# Patient Record
Sex: FEMALE | Race: WHITE
[De-identification: names, ages, dates, MRNs, and addresses within clinical notes are randomized per-mention and may not be internally consistent; named-entity substitution may affect disease eponyms.]

---

## 2024-04-12 ENCOUNTER — HOSPITAL ENCOUNTER (OUTPATIENT)
Dept: HOSPITAL 101 - US | Age: 44
Discharge: HOME | End: 2024-04-12
Payer: COMMERCIAL

## 2024-04-12 VITALS — HEART RATE: 69 BPM | OXYGEN SATURATION: 100 % | DIASTOLIC BLOOD PRESSURE: 85 MMHG | SYSTOLIC BLOOD PRESSURE: 116 MMHG

## 2024-04-12 DIAGNOSIS — E04.2: Primary | ICD-10-CM

## 2024-04-12 PROCEDURE — 99999: CPT

## 2024-04-12 PROCEDURE — 88173 CYTOPATH EVAL FNA REPORT: CPT

## 2024-04-12 PROCEDURE — 10005 FNA BX W/US GDN 1ST LES: CPT

## 2024-04-12 PROCEDURE — 10006 FNA BX W/US GDN EA ADDL: CPT

## 2024-04-12 RX ADMIN — LIDOCAINE HYDROCHLORIDE 0 ML: 10 INJECTION, SOLUTION INFILTRATION; PERINEURAL at 14:55

## 2024-09-09 ENCOUNTER — OFFICE VISIT (OUTPATIENT)
Dept: DERMATOLOGY | Age: 44
End: 2024-09-09
Payer: COMMERCIAL

## 2024-09-09 VITALS
OXYGEN SATURATION: 98 % | BODY MASS INDEX: 21.79 KG/M2 | TEMPERATURE: 98 F | DIASTOLIC BLOOD PRESSURE: 79 MMHG | HEIGHT: 66 IN | SYSTOLIC BLOOD PRESSURE: 121 MMHG | HEART RATE: 67 BPM

## 2024-09-09 DIAGNOSIS — Z79.899 ON ISOTRETINOIN THERAPY: ICD-10-CM

## 2024-09-09 DIAGNOSIS — H01.136 ECZEMATOUS DERMATITIS OF EYELIDS OF BOTH EYES, UNSPECIFIED EYELID: ICD-10-CM

## 2024-09-09 DIAGNOSIS — L66.1 FRONTAL FIBROSING ALOPECIA: Primary | ICD-10-CM

## 2024-09-09 DIAGNOSIS — H01.133 ECZEMATOUS DERMATITIS OF EYELIDS OF BOTH EYES, UNSPECIFIED EYELID: ICD-10-CM

## 2024-09-09 DIAGNOSIS — D23.30 FIBROUS PAPULE OF FACE: ICD-10-CM

## 2024-09-09 PROCEDURE — G8427 DOCREV CUR MEDS BY ELIG CLIN: HCPCS | Performed by: DERMATOLOGY

## 2024-09-09 PROCEDURE — 99204 OFFICE O/P NEW MOD 45 MIN: CPT | Performed by: DERMATOLOGY

## 2024-09-09 PROCEDURE — G8420 CALC BMI NORM PARAMETERS: HCPCS | Performed by: DERMATOLOGY

## 2024-09-09 PROCEDURE — 1036F TOBACCO NON-USER: CPT | Performed by: DERMATOLOGY

## 2024-09-09 RX ORDER — MINOXIDIL 2.5 MG/1
1.25 TABLET ORAL DAILY
Qty: 30 TABLET | Refills: 5 | Status: SHIPPED | OUTPATIENT
Start: 2024-09-09

## 2024-09-09 RX ORDER — CLOBETASOL PROPIONATE 0.5 MG/ML
SOLUTION TOPICAL
Qty: 50 ML | Refills: 2 | Status: CANCELLED | OUTPATIENT
Start: 2024-09-09

## 2024-09-09 RX ORDER — HYDROXYCHLOROQUINE SULFATE 200 MG/1
200 TABLET, FILM COATED ORAL 2 TIMES DAILY
COMMUNITY
Start: 2024-06-05

## 2024-09-09 RX ORDER — MINOXIDIL 2.5 MG/1
1.25 TABLET ORAL DAILY
COMMUNITY
Start: 2024-08-15 | End: 2024-09-09 | Stop reason: SDUPTHER

## 2024-09-11 RX ORDER — ISOTRETINOIN 30 MG/1
CAPSULE ORAL
Qty: 30 CAPSULE | Refills: 0 | Status: SHIPPED | OUTPATIENT
Start: 2024-09-11

## 2024-09-26 ENCOUNTER — HOSPITAL ENCOUNTER (OUTPATIENT)
Age: 44
Setting detail: SPECIMEN
Discharge: HOME | End: 2024-09-26
Payer: COMMERCIAL

## 2024-09-26 LAB — HCG UR QL: NEGATIVE

## 2024-09-26 PROCEDURE — 81025 URINE PREGNANCY TEST: CPT

## 2024-10-30 ENCOUNTER — TELEMEDICINE (OUTPATIENT)
Dept: DERMATOLOGY | Age: 44
End: 2024-10-30
Payer: COMMERCIAL

## 2024-10-30 DIAGNOSIS — Z79.899 ON ISOTRETINOIN THERAPY: Primary | ICD-10-CM

## 2024-10-30 PROCEDURE — 99214 OFFICE O/P EST MOD 30 MIN: CPT | Performed by: DERMATOLOGY

## 2024-10-30 PROCEDURE — G8428 CUR MEDS NOT DOCUMENT: HCPCS | Performed by: DERMATOLOGY

## 2024-10-30 NOTE — PROGRESS NOTES
malaise.    PHYSICAL EXAM:     Due to this being a TeleHealth encounter, evaluation of the following organ systems is limited: Vitals/Constitutional/EENT/Resp/CV/GI//MS/Neuro/Skin/Heme-Lymph-Imm. In particular examination of the skin is limited by video quality and patient available technology.    There were no vitals taken for this visit.    The patient is generally well appearing, well nourished, alert and conversational. Affect is normal.    Cutaneous Exam:  Physical Exam  Face and neck only were examined    Diagnoses/exam findings/medical history pertinent to this visit are listed below:    Assessment and Plan:  Assessment   1. On isotretinoin therapy  - Pregnancy, Urine; Future  - AST; Future  - ALT; Future  - Triglyceride; Future    2. Lichen planopilaris with facial papules  - chronic illness, responding to treatment but not yet at goal  - isotretinoin therapy discussed; patient interested in proceeding with this therapy  - Goal isotretinoin dosage: 9150 mg  (61 kg) * 150 mg/kg)  - Anticipated initial isotretinoin dose: 30 mg daily  - 2 forms of contraception include: 1.)  vasectomy and 2.) condoms.  - Patient counseled to immediately notify provider of any concerning side effects  - Patient counseled to not share the medication with anyone nor donate blood while on isotretinoin       - Check baseline AST, ALT, and Triglycerides, then after 1 month of therapy, then repeat only if increasing dose or if abnormal  - Check urine pregnancy test today and at monthly follow-up.  - Patient was given the opportunity to ask any questions about isotretinoin, its side effects, and iPledge requirements, all of which were discussed in detail at last visit. All questions answered to patient's satisfaction.    Assessment & Plan   RTC 1 month    There are no Patient Instructions on file for this visit.    Services were provided through a video synchronous discussion virtually to substitute for in-person clinic visit.

## 2024-11-04 ENCOUNTER — HOSPITAL ENCOUNTER (OUTPATIENT)
Dept: GENERAL RADIOLOGY | Age: 44
Discharge: HOME | End: 2024-11-06
Payer: COMMERCIAL

## 2024-11-04 ENCOUNTER — HOSPITAL ENCOUNTER (OUTPATIENT)
Age: 44
Discharge: HOME | End: 2024-11-06
Payer: COMMERCIAL

## 2024-11-04 ENCOUNTER — HOSPITAL ENCOUNTER (OUTPATIENT)
Age: 44
Discharge: HOME | End: 2024-11-04
Payer: COMMERCIAL

## 2024-11-04 DIAGNOSIS — M19.90 ARTHRITIS: ICD-10-CM

## 2024-11-04 DIAGNOSIS — Z79.899 ON ISOTRETINOIN THERAPY: ICD-10-CM

## 2024-11-04 LAB
ALT SERPL-CCNC: 9 U/L (ref 10–35)
AST SERPL-CCNC: 17 U/L (ref 10–35)
HCG UR QL: NEGATIVE
TRIGL SERPL-MCNC: 57 MG/DL

## 2024-11-04 PROCEDURE — 73502 X-RAY EXAM HIP UNI 2-3 VIEWS: CPT

## 2024-11-04 PROCEDURE — 73562 X-RAY EXAM OF KNEE 3: CPT

## 2024-11-04 PROCEDURE — 84478 ASSAY OF TRIGLYCERIDES: CPT

## 2024-11-04 PROCEDURE — 84450 TRANSFERASE (AST) (SGOT): CPT

## 2024-11-04 PROCEDURE — 36415 COLL VENOUS BLD VENIPUNCTURE: CPT

## 2024-11-04 PROCEDURE — 84460 ALANINE AMINO (ALT) (SGPT): CPT

## 2024-11-04 PROCEDURE — 81025 URINE PREGNANCY TEST: CPT

## 2024-11-08 ENCOUNTER — TELEPHONE (OUTPATIENT)
Dept: DERMATOLOGY | Age: 44
End: 2024-11-08

## 2024-11-08 NOTE — TELEPHONE ENCOUNTER
Patient has questions regarding the Accutane Prescription and Prior Authorization for the medication.

## 2024-11-25 ENCOUNTER — HOSPITAL ENCOUNTER (OUTPATIENT)
Age: 44
Setting detail: SPECIMEN
Discharge: HOME | End: 2024-11-25
Payer: COMMERCIAL

## 2024-11-25 DIAGNOSIS — L66.12 FRONTAL FIBROSING ALOPECIA: ICD-10-CM

## 2024-11-25 DIAGNOSIS — Z79.899 ON ISOTRETINOIN THERAPY: ICD-10-CM

## 2024-11-25 PROBLEM — E04.2 NONTOXIC MULTINODULAR GOITER: Status: ACTIVE | Noted: 2024-05-06

## 2024-11-25 LAB — HCG UR QL: NEGATIVE

## 2024-11-25 PROCEDURE — 81025 URINE PREGNANCY TEST: CPT

## 2024-11-25 RX ORDER — ISOTRETINOIN 30 MG/1
30 CAPSULE ORAL DAILY
Qty: 30 CAPSULE | Refills: 0 | Status: SHIPPED | OUTPATIENT
Start: 2024-11-25 | End: 2024-12-25

## 2024-12-06 ENCOUNTER — TELEMEDICINE (OUTPATIENT)
Dept: DERMATOLOGY | Age: 44
End: 2024-12-06

## 2024-12-06 DIAGNOSIS — Z79.899 ON ISOTRETINOIN THERAPY: Primary | ICD-10-CM

## 2024-12-06 DIAGNOSIS — L66.12 FRONTAL FIBROSING ALOPECIA: ICD-10-CM

## 2024-12-06 NOTE — PROGRESS NOTES
TELEHEALTH EVALUATION -- Audio/Visual    Dermatology Patient Note  [facility], [facility] DERMATOLOGY  [facility]  [address]  [facility] [address]  Dept: [phone]  Dept Fax: [phone]      VISITDATE: 12/6/2024   REFERRING PROVIDER: No ref. provider found      Nano Mora is a 44 y.o. female  who presents today in the office for:    No chief complaint on file.      HISTORY OF PRESENT ILLNESS:  Nano is on Isotretinoin. She is seen today for her monthly follow-up evaluation.     Interim disease course: skin is stable    The patient confirms that she has used both reported forms of birth control simultaneously for the past 30 days    Patient was queried about side effects from isotretinoin. She specifically denied mood changes, thoughts of wanting to hurt self or others, severe headaches or vision changes. She also denied any other side effects.    She had a delay in getting her medication due to missing pick-up window. She has been on isotretinoin for 8 days     CURRENT MEDICATIONS:   Current Outpatient Medications   Medication Sig Dispense Refill    fluticasone (FLONASE) 50 MCG/ACT nasal spray 2 sprays by Each Nostril route daily 16 g 0    ISOtretinoin (ACCUTANE) 30 MG chemo capsule Take 1 capsule by mouth daily 30 capsule 0    ISOtretinoin (ACCUTANE) 30 MG chemo capsule Take one tablet PO daily. DO NOT FILL. RX SENT TO GENERATE [facility] 30 capsule 0    minoxidil (LONITEN) 2.5 MG tablet Take 0.5 tablets by mouth daily 30 tablet 5    Norethin Ace-Eth Estrad-FE (MINASTRIN 24 FE) 1-20 MG-MCG(24) CHEW Minastrin 24 Fe 1-20 MG-MCG(24) Oral Tablet Chewable  Take 1 tablet daily   Quantity: 1 Refills: 2     Linda Granda N.P.;  Started [id number] Tablet Chewable Disp Pack (Patient not taking: Reported on 9/9/2024)       No current facility-administered medications for this visit.       ALLERGIES:   No Known Allergies    SOCIAL HISTORY:  Social History     Tobacco

## 2024-12-17 DIAGNOSIS — H66.001 NON-RECURRENT ACUTE SUPPURATIVE OTITIS MEDIA OF RIGHT EAR WITHOUT SPONTANEOUS RUPTURE OF TYMPANIC MEMBRANE: ICD-10-CM

## 2024-12-17 DIAGNOSIS — R09.81 CONGESTION OF NASAL SINUS: ICD-10-CM

## 2024-12-18 RX ORDER — FLUTICASONE PROPIONATE 50 MCG
2 SPRAY, SUSPENSION (ML) NASAL DAILY
Refills: 1 | OUTPATIENT
Start: 2024-12-18

## 2024-12-23 ENCOUNTER — HOSPITAL ENCOUNTER (OUTPATIENT)
Age: 44
Discharge: HOME | End: 2024-12-23
Payer: COMMERCIAL

## 2024-12-23 DIAGNOSIS — Z79.899 ON ISOTRETINOIN THERAPY: ICD-10-CM

## 2024-12-23 LAB
ALT SERPL-CCNC: 10 U/L (ref 10–35)
AST SERPL-CCNC: 17 U/L (ref 10–35)
HCG UR QL: NEGATIVE
TRIGL SERPL-MCNC: 55 MG/DL (ref 0–149)

## 2024-12-23 PROCEDURE — 84478 ASSAY OF TRIGLYCERIDES: CPT

## 2024-12-23 PROCEDURE — 81025 URINE PREGNANCY TEST: CPT

## 2024-12-23 PROCEDURE — 36415 COLL VENOUS BLD VENIPUNCTURE: CPT

## 2024-12-23 PROCEDURE — 84460 ALANINE AMINO (ALT) (SGPT): CPT

## 2024-12-23 PROCEDURE — 84450 TRANSFERASE (AST) (SGOT): CPT

## 2025-01-17 ENCOUNTER — TELEMEDICINE (OUTPATIENT)
Age: 45
End: 2025-01-17

## 2025-01-17 DIAGNOSIS — Z79.899 ON ISOTRETINOIN THERAPY: Primary | ICD-10-CM

## 2025-01-17 NOTE — PROGRESS NOTES
Assessment & Plan   RTC 4 weeks    There are no Patient Instructions on file for this visit.    Services were provided through a video synchronous discussion virtually to substitute for in-person clinic visit.     Electronically signed by Tyra Alston MD on 1/17/2025 at 8:39 AM     Nano Mora, was evaluated through a synchronous (real-time) audio-video encounter. The patient (or guardian if applicable) is aware that this is a billable service, which includes applicable co-pays. This Virtual Visit was conducted with patient's (and/or legal guardian's) consent. Patient identification was verified, and a caregiver was present when appropriate.   The patient was located at Home: [address] [place] [address]  Provider was located at Home (Appt Dept State): [place]  Confirm you are appropriately licensed, registered, or certified to deliver care in the state where the patient is located as indicated above. If you are not or unsure, please re-schedule the visit: Yes, I confirm.        Total time spent for this encounter: Not billed by time    --Tyra Alston MD on 1/17/2025 at 8:35 AM    An electronic signature was used to authenticate this note.

## 2025-01-22 ENCOUNTER — HOSPITAL ENCOUNTER (OUTPATIENT)
Age: 45
Discharge: HOME | End: 2025-01-22
Payer: COMMERCIAL

## 2025-01-22 DIAGNOSIS — Z79.899 ON ISOTRETINOIN THERAPY: ICD-10-CM

## 2025-01-22 LAB — HCG UR QL: NEGATIVE

## 2025-01-22 PROCEDURE — 81025 URINE PREGNANCY TEST: CPT

## 2025-01-23 NOTE — RESULT ENCOUNTER NOTE
Pregnancy test negative    Please confirm in ipledge and have patient complete her comprehension questions. Rx sent to pharmacy    [facility] despite me prescribing 60. Please call and make sure the correct amount is filled.

## 2025-02-14 ENCOUNTER — TELEMEDICINE (OUTPATIENT)
Age: 45
End: 2025-02-14

## 2025-02-14 DIAGNOSIS — Z79.899 ON ISOTRETINOIN THERAPY: Primary | ICD-10-CM

## 2025-02-14 NOTE — PROGRESS NOTES
Left voicemail to call back to schedule April VV appointment 2/14/2025 @ 12:12 pm   
discussion virtually to substitute for in-person clinic visit.     Electronically signed by Tyra Alston MD on 2/14/2025 at 9:20 AM     Nano Mora, was evaluated through a synchronous (real-time) audio-video encounter. The patient (or guardian if applicable) is aware that this is a billable service, which includes applicable co-pays. This Virtual Visit was conducted with patient's (and/or legal guardian's) consent. Patient identification was verified, and a caregiver was present when appropriate.   The patient was located at Home: [address] [place] [id number]  Provider was located at Home (Appt Dept State): [place]  Confirm you are appropriately licensed, registered, or certified to deliver care in the state where the patient is located as indicated above. If you are not or unsure, please re-schedule the visit: Yes, I confirm.        Total time spent for this encounter: Not billed by time    --Tyra Alston MD on 2/14/2025 at 9:20 AM    An electronic signature was used to authenticate this note.

## 2025-02-20 ENCOUNTER — HOSPITAL ENCOUNTER (OUTPATIENT)
Age: 45
Discharge: HOME | End: 2025-02-20
Payer: COMMERCIAL

## 2025-02-20 DIAGNOSIS — Z79.899 ON ISOTRETINOIN THERAPY: ICD-10-CM

## 2025-02-20 LAB
ALT SERPL-CCNC: 15 U/L (ref 10–35)
AST SERPL-CCNC: 22 U/L (ref 10–35)
HCG UR QL: NEGATIVE
TRIGL SERPL-MCNC: 84 MG/DL (ref 0–149)

## 2025-02-20 PROCEDURE — 81025 URINE PREGNANCY TEST: CPT

## 2025-02-20 PROCEDURE — 84460 ALANINE AMINO (ALT) (SGPT): CPT

## 2025-02-20 PROCEDURE — 84450 TRANSFERASE (AST) (SGOT): CPT

## 2025-02-20 PROCEDURE — 36415 COLL VENOUS BLD VENIPUNCTURE: CPT

## 2025-02-20 PROCEDURE — 84478 ASSAY OF TRIGLYCERIDES: CPT

## 2025-03-19 ENCOUNTER — OFFICE VISIT (OUTPATIENT)
Age: 45
End: 2025-03-19

## 2025-03-19 VITALS
HEART RATE: 79 BPM | TEMPERATURE: 98.2 F | DIASTOLIC BLOOD PRESSURE: 80 MMHG | HEIGHT: 66 IN | WEIGHT: 139 LBS | BODY MASS INDEX: 22.34 KG/M2 | OXYGEN SATURATION: 98 % | SYSTOLIC BLOOD PRESSURE: 126 MMHG

## 2025-03-19 DIAGNOSIS — L66.12 FRONTAL FIBROSING ALOPECIA: ICD-10-CM

## 2025-03-19 DIAGNOSIS — Z79.899 ON ISOTRETINOIN THERAPY: Primary | ICD-10-CM

## 2025-03-19 DIAGNOSIS — L30.8 OTHER ECZEMA: ICD-10-CM

## 2025-03-19 DIAGNOSIS — D23.30 FIBROUS PAPULE OF FACE: ICD-10-CM

## 2025-03-19 RX ORDER — TRIAMCINOLONE ACETONIDE 1 MG/G
OINTMENT TOPICAL
Qty: 80 G | Refills: 1 | Status: SHIPPED | OUTPATIENT
Start: 2025-03-19

## 2025-03-19 NOTE — PROGRESS NOTES
Dermatology Patient Note  [facility]  [facility] DERMATOLOGY  [address] [place] [address]  Dept: [phone]  Dept Fax: [phone]      VISITDATE: 3/19/2025   REFERRING PROVIDER: No ref. provider found      Nano Mora is a 44 y.o. female  who presents today in the office for:    Other (Patient presents today for a one month follow up of accutane. Seh is experiencing dry lips and bloody nose when she blows it. )    HISTORY OF PRESENT ILLNESS:  As above. Patient presents for a 1 month follow up for facial papules of FFA on isotretinoin therapy. At the last virtual visit, 2/14/25, she was continued on accutane with no SE. She has sent pictures of her face yesterday to monitor.     Today, patient is maintained on 60 mg isotretinoin with SE of dry lips and bloody mucus when blowing. Currently using saline spray to help with this. Scalp has been itchy for the past couple weeks but attributed it to weather changes. Currently using OTC tretinoin cream nighty, tolerating well.     Patient was queried about side effects from isotretinoin. She specifically denied mood changes, thoughts of wanting to hurt self or others, severe headaches or vision changes. She also denied any other side effects aside from dryness.     For alopecia, she has been using compounded clobetasol/tacrolimus solution daily on the scalp (from [place]). But she has not used PO minoxidil 1/2 tablet due to possible SE as she did have some heart palpitations when previously using PO minoxidil. Not currently taking plaquenil. Has noticed decreased shedding.     She reports dryness on her dorsal hands. Has been using OTC hydrocortisone on this with minimal help.     MEDICAL PROBLEMS:  Patient Active Problem List    Diagnosis Date Noted    Nontoxic multinodular goiter 05/06/2024       CURRENT MEDICATIONS:   Current Outpatient Medications   Medication Sig Dispense Refill    triamcinolone (KENALOG) 0.1 %

## 2025-03-19 NOTE — PATIENT INSTRUCTIONS
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 Patient will call if experiencing SE  - continue isotretinoin. Continue using nasal saline spray for dryness  - may continue OTC tretinoin nightly as tolerated

## 2025-03-29 ENCOUNTER — RESULTS FOLLOW-UP (OUTPATIENT)
Age: 45
End: 2025-03-29

## 2025-03-29 ENCOUNTER — HOSPITAL ENCOUNTER (OUTPATIENT)
Age: 45
Setting detail: SPECIMEN
Discharge: HOME | End: 2025-03-29
Payer: COMMERCIAL

## 2025-03-29 DIAGNOSIS — Z79.899 ON ISOTRETINOIN THERAPY: ICD-10-CM

## 2025-03-29 DIAGNOSIS — L66.12 FRONTAL FIBROSING ALOPECIA: Primary | ICD-10-CM

## 2025-03-29 LAB — HCG UR QL: NEGATIVE

## 2025-03-29 PROCEDURE — 81025 URINE PREGNANCY TEST: CPT

## 2025-03-29 RX ORDER — ISOTRETINOIN 30 MG/1
30 CAPSULE ORAL 2 TIMES DAILY
Qty: 60 CAPSULE | Refills: 0 | Status: SHIPPED | OUTPATIENT
Start: 2025-03-29 | End: 2025-04-28

## 2025-03-30 NOTE — RESULT ENCOUNTER NOTE
Pregnancy test negative    Please confirm in ipledge and have patient complete her comprehension questions. Rx sent to pharmacy.    Tried to confirm myself since it's Saturday and staff won't be able to confirm until Monday, but can't login to ipledge for some reason.

## 2025-04-11 ENCOUNTER — TELEMEDICINE (OUTPATIENT)
Age: 45
End: 2025-04-11

## 2025-04-11 ENCOUNTER — TELEPHONE (OUTPATIENT)
Age: 45
End: 2025-04-11

## 2025-04-11 DIAGNOSIS — Z79.2 ON ISOTRETINOIN THERAPY: Primary | ICD-10-CM

## 2025-04-11 DIAGNOSIS — L66.12 FRONTAL FIBROSING ALOPECIA: ICD-10-CM

## 2025-04-11 NOTE — PROGRESS NOTES
TELEHEALTH EVALUATION -- Audio/Visual    Dermatology Patient Note  [facility]  [facility] DERMATOLOGY  [address] [place] [address]  Dept: [phone]  Dept Fax: [phone]      VISITDATE: 4/11/2025   REFERRING PROVIDER: No ref. provider found      Nano Mora is a 44 y.o. female  who presents today in the office for:    No chief complaint on file.      HISTORY OF PRESENT ILLNESS:  As above. Patient presents for a 1 month follow up for facial papules of FFA on isotretinoin therapy.     Today, patient is maintained on 60 mg isotretinoin with SE of dry lips and dry hands. She started triamcinolone ointment for hands last month, and this helps    Patient was queried about side effects from isotretinoin. She specifically denied mood changes, thoughts of wanting to hurt self or others, severe headaches or vision changes. She also denied any other side effects aside from dryness.     For alopecia, she has been using compounded clobetasol/tacrolimus solution daily on the scalp (from [place]). But she has not used PO minoxidil 1/2 tablet due to possible SE as she did have some heart palpitations when previously using PO minoxidil. Not currently taking plaquenil. Has noticed decreased shedding.       CURRENT MEDICATIONS:   Current Outpatient Medications   Medication Sig Dispense Refill    ISOtretinoin (ACCUTANE) 30 MG chemo capsule Take 1 capsule by mouth 2 times daily 60 capsule 0    triamcinolone (KENALOG) 0.1 % ointment Apply to rash on hands twice daily (not face, armpit or groin) 80 g 1    fluticasone (FLONASE) 50 MCG/ACT nasal spray 2 sprays by Each Nostril route daily (Patient not taking: Reported on 3/19/2025) 16 g 0    ISOtretinoin (ACCUTANE) 30 MG chemo capsule Take one tablet PO daily. DO NOT FILL. RX SENT TO GENERATE PA 30 capsule 0    minoxidil (LONITEN) 2.5 MG tablet Take 0.5 tablets by mouth daily 30 tablet 5    Norethin Ace-Eth Estrad-FE (MINASTRIN 24 FE)

## 2025-04-11 NOTE — TELEPHONE ENCOUNTER
Patient called in requesting 6 wk virtual visit. [facility] not able to accommodate. Please call to discuss      Thank you

## 2025-05-03 ENCOUNTER — HOSPITAL ENCOUNTER (OUTPATIENT)
Age: 45
Discharge: HOME | End: 2025-05-03
Payer: COMMERCIAL

## 2025-05-03 LAB — HCG UR QL: NEGATIVE

## 2025-05-03 PROCEDURE — 81025 URINE PREGNANCY TEST: CPT

## 2025-05-04 ENCOUNTER — RESULTS FOLLOW-UP (OUTPATIENT)
Age: 45
End: 2025-05-04

## 2025-05-04 DIAGNOSIS — L66.12 FRONTAL FIBROSING ALOPECIA: Primary | ICD-10-CM

## 2025-05-04 RX ORDER — ISOTRETINOIN 30 MG/1
30 CAPSULE ORAL 2 TIMES DAILY
Qty: 60 CAPSULE | Refills: 0 | Status: SHIPPED | OUTPATIENT
Start: 2025-05-04 | End: 2025-06-03

## 2025-05-05 NOTE — RESULT ENCOUNTER NOTE
Pregnancy test negative    Please confirm in ipledge and have patient complete her comprehension questions. Rx sent to pharmacy

## 2025-05-23 ENCOUNTER — TELEMEDICINE (OUTPATIENT)
Age: 45
End: 2025-05-23
Payer: COMMERCIAL

## 2025-05-23 DIAGNOSIS — Z79.2 ON ISOTRETINOIN THERAPY: ICD-10-CM

## 2025-05-23 DIAGNOSIS — L70.0 ACNE VULGARIS: Primary | ICD-10-CM

## 2025-05-23 PROCEDURE — G8428 CUR MEDS NOT DOCUMENT: HCPCS | Performed by: DERMATOLOGY

## 2025-05-23 PROCEDURE — 99213 OFFICE O/P EST LOW 20 MIN: CPT | Performed by: DERMATOLOGY

## 2025-05-23 RX ORDER — TAZAROTENE 1 MG/G
CREAM TOPICAL
Qty: 30 G | Refills: 3 | Status: SHIPPED | OUTPATIENT
Start: 2025-05-23

## 2025-05-23 NOTE — PROGRESS NOTES
LVM to return call to set up appointment.   
indicated above. If you are not or unsure, please re-schedule the visit: Yes, I confirm.        Total time spent for this encounter: Not billed by time    --Tyra Alston MD on 5/23/2025 at 9:34 AM    An electronic signature was used to authenticate this note.

## 2025-06-10 ENCOUNTER — RESULTS FOLLOW-UP (OUTPATIENT)
Age: 45
End: 2025-06-10

## 2025-06-10 ENCOUNTER — HOSPITAL ENCOUNTER (OUTPATIENT)
Age: 45
Discharge: HOME | End: 2025-06-10
Payer: COMMERCIAL

## 2025-06-10 DIAGNOSIS — L70.0 ACNE VULGARIS: Primary | ICD-10-CM

## 2025-06-10 DIAGNOSIS — Z79.2 ON ISOTRETINOIN THERAPY: ICD-10-CM

## 2025-06-10 DIAGNOSIS — D23.30 FIBROUS PAPULE OF FACE: ICD-10-CM

## 2025-06-10 DIAGNOSIS — L66.12 FRONTAL FIBROSING ALOPECIA: ICD-10-CM

## 2025-06-10 LAB — HCG UR QL: NEGATIVE

## 2025-06-10 PROCEDURE — 81025 URINE PREGNANCY TEST: CPT

## 2025-06-10 PROCEDURE — 36415 COLL VENOUS BLD VENIPUNCTURE: CPT

## 2025-06-10 RX ORDER — ISOTRETINOIN 30 MG/1
30 CAPSULE ORAL DAILY
Qty: 30 CAPSULE | Refills: 0 | Status: SHIPPED | OUTPATIENT
Start: 2025-06-10 | End: 2025-07-10

## 2025-06-12 ENCOUNTER — PATIENT MESSAGE (OUTPATIENT)
Age: 45
End: 2025-06-12

## 2025-06-12 DIAGNOSIS — L65.9 HYPOTRICHOSIS: Primary | ICD-10-CM

## 2025-06-12 RX ORDER — BIMATOPROST 3 UG/ML
SOLUTION TOPICAL
Qty: 5 ML | Refills: 2 | Status: SHIPPED | OUTPATIENT
Start: 2025-06-12

## 2025-07-21 NOTE — PROGRESS NOTES
Dermatology Patient Note  [facility]  [facility] DERMATOLOGY  [address] [place] [address]  Dept: [phone]  Dept Fax: [phone]      VISITDATE: 7/24/2025   REFERRING PROVIDER: No ref. provider found      Nano Mora is a 44 y.o. female  who presents today in the office for:    Other (Patient presents today as a f/u accutane.  Patient completed her 6 mo program last Monday.  Patient is currently now using tretinoin cream. Patient is happy with skin,slightly dry.  )      HISTORY OF PRESENT ILLNESS:  As above. Patient presents for a 1 month follow up for frontal fibrosing alopecia. At the last visit, 5/23/25, continued isotretinoin until completed and advised to start tazarotene cream. Continued triamcinolone for hand dermatitis.     Today, patient reports significant improvement of her facial papules since completing Accutane. She notes she has been consistent with application of Tazarotene with some dryness.     She denies pruritus or burning of her scalp. She inquires about how growth patterns as her hair has gotten more curly. She is no longer taking oral minoxidil. Not currently taking plaquenil. Using compounded clobetasol/tacrolimus solution    MEDICAL PROBLEMS:  Patient Active Problem List    Diagnosis Date Noted    Nontoxic multinodular goiter 05/06/2024       CURRENT MEDICATIONS:   Current Outpatient Medications   Medication Sig Dispense Refill    bimatoprost 0.03 % SOLN Apply to lash line daily 5 mL 2    tazarotene (TAZORAC) 0.1 % cream Apply topically nightly. 30 g 3    triamcinolone (KENALOG) 0.1 % ointment Apply to rash on hands twice daily (not face, armpit or groin) 80 g 1    ISOtretinoin (ACCUTANE) 30 MG chemo capsule Take one tablet PO daily. DO NOT FILL. RX SENT TO GENERATE PA 30 capsule 0    minoxidil (LONITEN) 2.5 MG tablet Take 0.5 tablets by mouth daily 30 tablet 5    fluticasone (FLONASE) 50 MCG/ACT nasal spray 2 sprays by Each Nostril route

## 2025-07-24 ENCOUNTER — OFFICE VISIT (OUTPATIENT)
Age: 45
End: 2025-07-24
Payer: COMMERCIAL

## 2025-07-24 VITALS
BODY MASS INDEX: 26.2 KG/M2 | OXYGEN SATURATION: 99 % | HEIGHT: 66 IN | SYSTOLIC BLOOD PRESSURE: 114 MMHG | DIASTOLIC BLOOD PRESSURE: 78 MMHG | TEMPERATURE: 98 F | HEART RATE: 68 BPM | WEIGHT: 163 LBS

## 2025-07-24 DIAGNOSIS — L70.0 ACNE VULGARIS: ICD-10-CM

## 2025-07-24 DIAGNOSIS — L66.12 FRONTAL FIBROSING ALOPECIA: Primary | ICD-10-CM

## 2025-07-24 DIAGNOSIS — L65.9 HYPOTRICHOSIS: ICD-10-CM

## 2025-07-24 PROCEDURE — 1036F TOBACCO NON-USER: CPT | Performed by: DERMATOLOGY

## 2025-07-24 PROCEDURE — 99214 OFFICE O/P EST MOD 30 MIN: CPT | Performed by: DERMATOLOGY

## 2025-07-24 PROCEDURE — G8419 CALC BMI OUT NRM PARAM NOF/U: HCPCS | Performed by: DERMATOLOGY

## 2025-07-24 PROCEDURE — G8427 DOCREV CUR MEDS BY ELIG CLIN: HCPCS | Performed by: DERMATOLOGY

## 2025-07-24 RX ORDER — HYDROXYCHLOROQUINE SULFATE 200 MG/1
200 TABLET, FILM COATED ORAL 2 TIMES DAILY
Qty: 60 TABLET | Refills: 5 | Status: SHIPPED | OUTPATIENT
Start: 2025-07-24

## 2025-07-24 RX ORDER — TAZAROTENE 1 MG/G
CREAM TOPICAL
Qty: 30 G | Refills: 3 | Status: SHIPPED | OUTPATIENT
Start: 2025-07-24

## 2025-07-24 RX ORDER — BIMATOPROST 3 UG/ML
SOLUTION TOPICAL
Qty: 5 ML | Refills: 2 | Status: SHIPPED | OUTPATIENT
Start: 2025-07-24